# Patient Record
Sex: FEMALE | Race: ASIAN | NOT HISPANIC OR LATINO | URBAN - METROPOLITAN AREA
[De-identification: names, ages, dates, MRNs, and addresses within clinical notes are randomized per-mention and may not be internally consistent; named-entity substitution may affect disease eponyms.]

---

## 2021-06-23 ENCOUNTER — EMERGENCY (EMERGENCY)
Facility: HOSPITAL | Age: 21
LOS: 1 days | Discharge: HOME | End: 2021-06-23
Admitting: EMERGENCY MEDICINE
Payer: MEDICAID

## 2021-06-23 VITALS
RESPIRATION RATE: 16 BRPM | DIASTOLIC BLOOD PRESSURE: 77 MMHG | HEART RATE: 70 BPM | SYSTOLIC BLOOD PRESSURE: 119 MMHG | TEMPERATURE: 98 F | OXYGEN SATURATION: 100 %

## 2021-06-23 PROCEDURE — 99283 EMERGENCY DEPT VISIT LOW MDM: CPT
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
## 2021-06-24 PROCEDURE — 73564 X-RAY EXAM KNEE 4 OR MORE: CPT | Mod: 26,LT
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
## 2021-06-24 NOTE — ED PROVIDER NOTE - PATIENT PORTAL LINK FT
You can access the FollowMyHealth Patient Portal offered by [facility] by registering at the following website: [email or web address]. By joining [employer]s FollowMyHealth portal, you will also be able to view your health information using other applications (apps) compatible with our system.

## 2021-07-01 ENCOUNTER — EMERGENCY (EMERGENCY)
Facility: HOSPITAL | Age: 21
LOS: 1 days | Discharge: HOME | End: 2021-07-01
Admitting: STUDENT IN AN ORGANIZED HEALTH CARE EDUCATION/TRAINING PROGRAM
Payer: MEDICAID

## 2021-07-01 VITALS
HEART RATE: 98 BPM | SYSTOLIC BLOOD PRESSURE: 114 MMHG | TEMPERATURE: 99 F | OXYGEN SATURATION: 100 % | RESPIRATION RATE: 16 BRPM | DIASTOLIC BLOOD PRESSURE: 62 MMHG

## 2021-07-01 PROCEDURE — 99284 EMERGENCY DEPT VISIT MOD MDM: CPT

## 2021-07-01 RX ORDER — EPINEPHRINE 0.3 MG/.3ML
1 INJECTION INTRAMUSCULAR; SUBCUTANEOUS
Qty: 1 | Refills: 0
Start: 2021-07-01 | End: 2021-07-01

## 2021-07-01 RX ORDER — DIPHENHYDRAMINE HCL 50 MG
25 CAPSULE ORAL ONCE
Refills: 0 | Status: COMPLETED | OUTPATIENT
Start: 2021-07-01 | End: 2021-07-01

## 2021-07-01 RX ORDER — DIPHENHYDRAMINE HCL 50 MG
1 CAPSULE ORAL
Qty: 10 | Refills: 0
Start: 2021-07-01 | End: 2021-07-10

## 2021-07-01 RX ADMIN — Medication 25 MILLIGRAM(S): at 13:46

## 2021-07-01 NOTE — ED PROVIDER NOTE - OBJECTIVE STATEMENT
20F with no significant pmh BIB mother today p/w R neck lump x 2 weeks, diffuse rash x several days and upper lip swelling yesterday. Pt noticed 2 lumps on the R side of her neck. Lump non tender and without and redness. States she noticed the lumps decreasing in size. Rash is pruritic, diffuse over torso, extremities, sparring the palms, face and soles of feet. States the rash last for a day and disappears the next day. Currently rash completely resolved. Noticed upper lip swelling yesterday which is now greatly improved. No suspicious food intake, taking new medication, drug use, respiratory distress, tongue swelling, globus sensation, trismus, excessive drooling.

## 2021-07-01 NOTE — ED ADULT TRIAGE NOTE - CHIEF COMPLAINT QUOTE
Pt c/o of lump to right side of neck, lip swelling and intermittent rash like throughout body. Denies sob, n/v/d, chills/fevers. Pt able to speak in full complete sentences, appears comfortable at triage.

## 2021-07-01 NOTE — ED PROVIDER NOTE - PATIENT PORTAL LINK FT
You can access the FollowMyHealth Patient Portal offered by [facility] by registering at the following website: [email or web address]. By joining [employer]’s FollowMyHealth portal, you will also be able to view your health information using other applications (apps) compatible with our system.

## 2021-07-01 NOTE — ED PROVIDER NOTE - PHYSICAL EXAMINATION
constitutional: well appearing no acute distress, sitting comfortably   HEENT: head- normocephalic, atraumatic                 ears- ears clear b/l, TM visualized b/l                 eyes- PERRLA                neck- full rom, no rigidity, small 2 - 1cm mobile R posterior cerivcal LAD                 throat- clear oropharynx no tonsillar exudates or tonsillar swelling, uvula midline without edema  Cardiac: regular rate and rhythm, normal S1 S2 no murmurs rubs or gallops  Respiratory: able to speak in full sentences, no wheezing rales or rhonchi, lungs CTA b/l   Abd: Soft non tender non distended, no guarding, no palpable hernias or masses   Msk: no LE edema, no tenderness b/l   Neuro: A x O x 4, face symmetric, cn 2-12 in tact, strength 5/5 in b/l ue and le, sensation grossly in tact to light touch, no dysmetria or nystagmus, neg pronator drift neg romberg's, no ataxia   psych: normal behavior and thought process  Skin: no notable rash over extremities, torso/ face, no signs of angioedema

## 2021-07-01 NOTE — ED ADULT NURSE NOTE - OBJECTIVE STATEMENT
Pt AOX4 c/o of 2 lumps to back of neck, 2 distinct small nodules felt, pt denies tenderness, denies any recent ENT infection or trauma to area; medication given as per MD.

## 2021-07-01 NOTE — ED PROVIDER NOTE - CLINICAL SUMMARY MEDICAL DECISION MAKING FREE TEXT BOX
20F with no significant pmh BIB mother today p/w R neck lump x 2 weeks, diffuse rash x several days and upper lip swelling yesterday. No signs of angioedema on exam. no rashes appreciated. ddx: heat rash? vs allergic reaction.     Plan:  - will give 1 dose benadryl in ER and observe  - if no signs of worsening symptoms will d/c and give ENT f/u for LAD and strict return precautions for any worsening symptoms including globus/ throat closing sensation, worsening rash.

## 2021-07-01 NOTE — ED PROVIDER NOTE - NSFOLLOWUPINSTRUCTIONS_ED_ALL_ED_FT
You were seen and evaluated at [facility] ER. take benadryl as needed for any itching and rash. Return to ER immediately should you have to use the epi pen or have any worsening symptoms. you were also given ENT referral to follow up the lymph node.

## 2021-07-01 NOTE — ED PROVIDER NOTE - PROGRESS NOTE DETAILS
Maciel Reed: pt observed in the ER for any worsening symptoms of rash, angioedema. Pt is well appearing without any symptoms at this time. ENT referral list given to f/u on LAD. Pt given  return precautions should she develop worsening symptoms especially w/ airway involvement. Pt verbalizes understanding and agrees w/ plan.

## 2021-11-12 ENCOUNTER — EMERGENCY (EMERGENCY)
Facility: HOSPITAL | Age: 21
LOS: 1 days | Discharge: HOME | End: 2021-11-12
Attending: STUDENT IN AN ORGANIZED HEALTH CARE EDUCATION/TRAINING PROGRAM | Admitting: STUDENT IN AN ORGANIZED HEALTH CARE EDUCATION/TRAINING PROGRAM
Payer: MEDICAID

## 2021-11-12 VITALS
TEMPERATURE: 99 F | HEART RATE: 77 BPM | OXYGEN SATURATION: 100 % | SYSTOLIC BLOOD PRESSURE: 115 MMHG | DIASTOLIC BLOOD PRESSURE: 67 MMHG | RESPIRATION RATE: 18 BRPM

## 2021-11-12 PROCEDURE — 64450 NJX AA&/STRD OTHER PN/BRANCH: CPT

## 2021-11-12 PROCEDURE — 73140 X-RAY EXAM OF FINGER(S): CPT | Mod: 26,LT

## 2021-11-12 PROCEDURE — 99283 EMERGENCY DEPT VISIT LOW MDM: CPT | Mod: 25

## 2021-11-12 RX ORDER — TETANUS TOXOID, REDUCED DIPHTHERIA TOXOID AND ACELLULAR PERTUSSIS VACCINE, ADSORBED 5; 2.5; 8; 8; 2.5 [IU]/.5ML; [IU]/.5ML; UG/.5ML; UG/.5ML; UG/.5ML
0.5 SUSPENSION INTRAMUSCULAR ONCE
Refills: 0 | Status: COMPLETED | OUTPATIENT
Start: 2021-11-12 | End: 2021-11-12

## 2021-11-12 RX ADMIN — TETANUS TOXOID, REDUCED DIPHTHERIA TOXOID AND ACELLULAR PERTUSSIS VACCINE, ADSORBED 0.5 MILLILITER(S): 5; 2.5; 8; 8; 2.5 SUSPENSION INTRAMUSCULAR at 23:28

## 2021-11-12 NOTE — ED ADULT TRIAGE NOTE - CHIEF COMPLAINT QUOTE
Pt st" I have acrylic nails and I ripped the nail ...it is cracked and sort of 1/2 ripped off...happen 3 hours ago"

## 2021-11-12 NOTE — ED PROVIDER NOTE - CARE PROVIDER_API CALL
Marco Bush)  Plastic Surgery  [address] [place] [address], [address] [place] [id number]  Phone: [phone]  Fax: [phone]  Follow Up Time:

## 2021-11-12 NOTE — ED PROVIDER NOTE - NS ED ROS FT
GENERAL: No fever, no chills  EYES: No change in vision  HEENT: No trouble swallowing or speaking  CARDIAC: No chest pain  PULMONARY: No cough, no SOB  GI: No abdominal pain, no nausea or no vomiting, no diarrhea, no constipation  : No changes in urination  SKIN: No rashes  NEURO: No headache, no numbness  MSK: No joint pain  Otherwise as HPI or negative. GENERAL: No fever, no chills  EYES: No change in vision  HEENT: No trouble swallowing or speaking  CARDIAC: No chest pain  PULMONARY: No cough, no SOB  GI: No abdominal pain, no nausea or no vomiting, no diarrhea, no constipation  : No changes in urination  SKIN: No rashes  NEURO: No headache, no numbness  MSK: +finger pain   Otherwise as HPI or negative.

## 2021-11-12 NOTE — ED PROVIDER NOTE - PHYSICAL EXAMINATION
Gen: NAD, AOx3, able to make needs known, non-toxic  Head: NCAT  HEENT: pupils equal and reactive to light, EOMI, oral mucosa moist, normal conjunctiva  Lung: CTAB, no respiratory distress, no wheezes/rhonchi/rales B/L, speaking in full sentences  CV: RRR, no M/R/G, pulses bilaterally   Abd: soft, NTND, no guarding, no CVA tenderness, BSX4  MSK: no visible deformities  Neuro: No focal sensory or motor deficits  Skin: Warm, well perfused, no rash  Psych: normal affect Gen: NAD, AOx3, able to make needs known, non-toxic  Head: NCAT  HEENT: EOMI, normal conjunctiva  MSK: left 3rd finger nail still on finger, horizontal breakage visualized, no current bleeding, unable to visualize nail bed, no hematoma around nail bed   Neuro: No focal sensory or motor deficits  Skin: Warm, well perfused, no rash  Psych: normal affect

## 2021-11-12 NOTE — ED PROVIDER NOTE - NSFOLLOWUPINSTRUCTIONS_ED_ALL_ED_FT
Follow up with the hand surgeon this week for continued evaluation of your nail injury. Keep your dressing intact for 24 hours and then you may change daily. Keep the area clean and dry. Return for any new/worsening symptoms or any other concern such as worsening pain/swelling under nail, pus drainage, fever, spreading pain or any other concern

## 2021-11-12 NOTE — ED PROVIDER NOTE - PATIENT PORTAL LINK FT
You can access the FollowMyHealth Patient Portal offered by [facility] by registering at the following website: [email or web address]. By joining [employer]’s FollowMyHealth portal, you will also be able to view your health information using other applications (apps) compatible with our system.

## 2021-11-12 NOTE — ED PROVIDER NOTE - OBJECTIVE STATEMENT
22yo woman who wears acrylics nails coming in after being in the gym and getting her left 3rd finger nail caught in between her shoe and the barbell, causing a horizontal breakage of the nail. Profuse bleeding on scene, not currently bleeding right now. Nail still on finger. Denies PMH, meds, allergies.

## 2021-11-12 NOTE — ED PROVIDER NOTE - ATTENDING CONTRIBUTION TO CARE
I have personally performed a face to face medical and diagnostic evaluation of the patient. I have discussed with and reviewed the Resident's note and agree with the History, ROS, Physical Exam and MDM unless otherwise indicated. A brief summary of my personal evaluation and impression can be found below.    22yo F s/p injury to L 3rd fingernail getting caught between shoe and barbell causing breakage of nail, was not dropped onto nail, bleeding transiently now resolved. No significant pain to area currently, nail still on. Acrylic nail in place.  VITALS: Initial triage and subsequent vitals have been reviewed by me.  Gen: Well appearing, NAD, alert, non-toxic  Head: NCAT  Lung: CTAB, no adventitious sounds  CV: RRR, no murmurs, 2+symmetric peripheral pulses  MSK: No edema, no visible deformities, no bony ttp  Neuro: Moving all extremity grossly, following commands appropriately, fluid speech, sensation grossly intact  Skin: acrylic nail in place w/ horizontal breakage across distal 3rd  Psych: normal mood and affect  AFTER PARTIAL REMOVAL of nail: No nailbed lac or subungual hematoma no active bleeding  S/p nailbed partial avulsion clean w/ no subungual hematoma or nailbed lac. Will give tdap, xr r/o fx and dc w/ hand surg fu for cintinued eval

## 2022-07-10 ENCOUNTER — EMERGENCY (EMERGENCY)
Facility: HOSPITAL | Age: 22
LOS: 1 days | Discharge: HOME | End: 2022-07-10
Attending: EMERGENCY MEDICINE | Admitting: EMERGENCY MEDICINE

## 2022-07-10 VITALS
RESPIRATION RATE: 20 BRPM | OXYGEN SATURATION: 99 % | TEMPERATURE: 98 F | HEART RATE: 78 BPM | DIASTOLIC BLOOD PRESSURE: 67 MMHG | SYSTOLIC BLOOD PRESSURE: 114 MMHG

## 2022-07-10 VITALS
HEART RATE: 75 BPM | TEMPERATURE: 98 F | RESPIRATION RATE: 16 BRPM | DIASTOLIC BLOOD PRESSURE: 60 MMHG | OXYGEN SATURATION: 100 % | SYSTOLIC BLOOD PRESSURE: 118 MMHG

## 2022-07-10 LAB
ALBUMIN SERPL ELPH-MCNC: 4.5 G/DL — SIGNIFICANT CHANGE UP (ref 3.3–5)
ALP SERPL-CCNC: 62 U/L — SIGNIFICANT CHANGE UP (ref 40–120)
ALT FLD-CCNC: 10 U/L — SIGNIFICANT CHANGE UP (ref 4–33)
ANION GAP SERPL CALC-SCNC: 13 MMOL/L — SIGNIFICANT CHANGE UP (ref 7–14)
APPEARANCE UR: ABNORMAL
AST SERPL-CCNC: 18 U/L — SIGNIFICANT CHANGE UP (ref 4–32)
BACTERIA # UR AUTO: ABNORMAL
BASOPHILS # BLD AUTO: 0.03 K/UL — SIGNIFICANT CHANGE UP (ref 0–0.2)
BASOPHILS NFR BLD AUTO: 0.3 % — SIGNIFICANT CHANGE UP (ref 0–2)
BILIRUB SERPL-MCNC: 0.9 MG/DL — SIGNIFICANT CHANGE UP (ref 0.2–1.2)
BILIRUB UR-MCNC: NEGATIVE — SIGNIFICANT CHANGE UP
BUN SERPL-MCNC: 8 MG/DL — SIGNIFICANT CHANGE UP (ref 7–23)
CALCIUM SERPL-MCNC: 9.6 MG/DL — SIGNIFICANT CHANGE UP (ref 8.4–10.5)
CHLORIDE SERPL-SCNC: 100 MMOL/L — SIGNIFICANT CHANGE UP (ref 98–107)
CO2 SERPL-SCNC: 24 MMOL/L — SIGNIFICANT CHANGE UP (ref 22–31)
COLOR SPEC: YELLOW — SIGNIFICANT CHANGE UP
COMMENT - URINE: SIGNIFICANT CHANGE UP
CREAT SERPL-MCNC: 0.7 MG/DL — SIGNIFICANT CHANGE UP (ref 0.5–1.3)
DIFF PNL FLD: ABNORMAL
EGFR: 127 ML/MIN/1.73M2 — SIGNIFICANT CHANGE UP
EOSINOPHIL # BLD AUTO: 0.04 K/UL — SIGNIFICANT CHANGE UP (ref 0–0.5)
EOSINOPHIL NFR BLD AUTO: 0.4 % — SIGNIFICANT CHANGE UP (ref 0–6)
EPI CELLS # UR: 3 /HPF — SIGNIFICANT CHANGE UP (ref 0–5)
GLUCOSE SERPL-MCNC: 90 MG/DL — SIGNIFICANT CHANGE UP (ref 70–99)
GLUCOSE UR QL: NEGATIVE — SIGNIFICANT CHANGE UP
HCT VFR BLD CALC: 40.9 % — SIGNIFICANT CHANGE UP (ref 34.5–45)
HGB BLD-MCNC: 13.4 G/DL — SIGNIFICANT CHANGE UP (ref 11.5–15.5)
HYALINE CASTS # UR AUTO: 3 /LPF — SIGNIFICANT CHANGE UP (ref 0–7)
IANC: 6.67 K/UL — SIGNIFICANT CHANGE UP (ref 1.8–7.4)
IMM GRANULOCYTES NFR BLD AUTO: 0.4 % — SIGNIFICANT CHANGE UP (ref 0–1.5)
KETONES UR-MCNC: ABNORMAL
LEUKOCYTE ESTERASE UR-ACNC: ABNORMAL
LYMPHOCYTES # BLD AUTO: 1.49 K/UL — SIGNIFICANT CHANGE UP (ref 1–3.3)
LYMPHOCYTES # BLD AUTO: 16.6 % — SIGNIFICANT CHANGE UP (ref 13–44)
MCHC RBC-ENTMCNC: 27.5 PG — SIGNIFICANT CHANGE UP (ref 27–34)
MCHC RBC-ENTMCNC: 32.8 GM/DL — SIGNIFICANT CHANGE UP (ref 32–36)
MCV RBC AUTO: 84 FL — SIGNIFICANT CHANGE UP (ref 80–100)
MONOCYTES # BLD AUTO: 0.73 K/UL — SIGNIFICANT CHANGE UP (ref 0–0.9)
MONOCYTES NFR BLD AUTO: 8.1 % — SIGNIFICANT CHANGE UP (ref 2–14)
NEUTROPHILS # BLD AUTO: 6.67 K/UL — SIGNIFICANT CHANGE UP (ref 1.8–7.4)
NEUTROPHILS NFR BLD AUTO: 74.2 % — SIGNIFICANT CHANGE UP (ref 43–77)
NITRITE UR-MCNC: NEGATIVE — SIGNIFICANT CHANGE UP
NRBC # BLD: 0 /100 WBCS — SIGNIFICANT CHANGE UP
NRBC # FLD: 0 K/UL — SIGNIFICANT CHANGE UP
PH UR: 6.5 — SIGNIFICANT CHANGE UP (ref 5–8)
PLATELET # BLD AUTO: 196 K/UL — SIGNIFICANT CHANGE UP (ref 150–400)
POTASSIUM SERPL-MCNC: 4.3 MMOL/L — SIGNIFICANT CHANGE UP (ref 3.5–5.3)
POTASSIUM SERPL-SCNC: 4.3 MMOL/L — SIGNIFICANT CHANGE UP (ref 3.5–5.3)
PROT SERPL-MCNC: 7.5 G/DL — SIGNIFICANT CHANGE UP (ref 6–8.3)
PROT UR-MCNC: ABNORMAL
RBC # BLD: 4.87 M/UL — SIGNIFICANT CHANGE UP (ref 3.8–5.2)
RBC # FLD: 13.3 % — SIGNIFICANT CHANGE UP (ref 10.3–14.5)
RBC CASTS # UR COMP ASSIST: >50 /HPF — SIGNIFICANT CHANGE UP (ref 0–4)
SODIUM SERPL-SCNC: 137 MMOL/L — SIGNIFICANT CHANGE UP (ref 135–145)
SP GR SPEC: 1.02 — SIGNIFICANT CHANGE UP (ref 1–1.05)
UROBILINOGEN FLD QL: SIGNIFICANT CHANGE UP
WBC # BLD: 9 K/UL — SIGNIFICANT CHANGE UP (ref 3.8–10.5)
WBC # FLD AUTO: 9 K/UL — SIGNIFICANT CHANGE UP (ref 3.8–10.5)
WBC UR QL: >50 /HPF — SIGNIFICANT CHANGE UP (ref 0–5)

## 2022-07-10 PROCEDURE — 99285 EMERGENCY DEPT VISIT HI MDM: CPT

## 2022-07-10 PROCEDURE — 74177 CT ABD & PELVIS W/CONTRAST: CPT | Mod: 26,MA

## 2022-07-10 PROCEDURE — 76770 US EXAM ABDO BACK WALL COMP: CPT | Mod: 26

## 2022-07-10 RX ORDER — CEPHALEXIN 500 MG
1 CAPSULE ORAL
Qty: 20 | Refills: 0
Start: 2022-07-10 | End: 2022-07-19

## 2022-07-10 RX ORDER — IBUPROFEN 200 MG
600 TABLET ORAL ONCE
Refills: 0 | Status: COMPLETED | OUTPATIENT
Start: 2022-07-10 | End: 2022-07-10

## 2022-07-10 RX ORDER — CEFPODOXIME PROXETIL 100 MG
200 TABLET ORAL ONCE
Refills: 0 | Status: COMPLETED | OUTPATIENT
Start: 2022-07-10 | End: 2022-07-10

## 2022-07-10 RX ORDER — CEFPODOXIME PROXETIL 100 MG
1 TABLET ORAL
Qty: 20 | Refills: 0
Start: 2022-07-10 | End: 2022-07-19

## 2022-07-10 RX ORDER — ACETAMINOPHEN 500 MG
650 TABLET ORAL ONCE
Refills: 0 | Status: COMPLETED | OUTPATIENT
Start: 2022-07-10 | End: 2022-07-10

## 2022-07-10 RX ORDER — LIDOCAINE 4 G/100G
1 CREAM TOPICAL ONCE
Refills: 0 | Status: COMPLETED | OUTPATIENT
Start: 2022-07-10 | End: 2022-07-10

## 2022-07-10 RX ADMIN — Medication 650 MILLIGRAM(S): at 17:26

## 2022-07-10 RX ADMIN — Medication 200 MILLIGRAM(S): at 18:54

## 2022-07-10 RX ADMIN — LIDOCAINE 1 PATCH: 4 CREAM TOPICAL at 17:26

## 2022-07-10 RX ADMIN — Medication 600 MILLIGRAM(S): at 17:40

## 2022-07-10 NOTE — ED PROVIDER NOTE - ATTENDING CONTRIBUTION TO CARE
DR. BLOCH, ATTENDING MD-  I performed a face to face bedside interview with patient regarding history of present illness, review of symptoms and past medical history. I completed an independent physical exam.  I have discussed patient's plan of care with the resident.  Patient examined, well appearing NAD HEENT nml heart s1s2, lungs clear, abd soft nontender, extrem no edema, pos right CVA tenderness, extrem neg slr, neuro intact

## 2022-07-10 NOTE — ED PROVIDER NOTE - MDM ORDERS SUBMITTED SELECTION

## 2022-07-10 NOTE — ED ADULT NURSE REASSESSMENT NOTE - NS ED NURSE REASSESS COMMENT FT1
PO challenge passed. Pt in NAD, denies nausea, vomiting or abd pain
PT A&OX4.  No distress noted.  Denies any discomfort.  IVL Left 20G in place and tolerating well.  Labs sent.  PT ABX given.  Will continue to monitor.
patient alert ox4 came in c/o flank pain. denies any pain now. awaiting results.

## 2022-07-10 NOTE — ED PROVIDER NOTE - PATIENT PORTAL LINK FT
You can access the FollowMyHealth Patient Portal offered by [facility] by registering at the following website: [email or web address]. By joining [employer]’s FollowMyHealth portal, you will also be able to view your health information using other applications (apps) compatible with our system. You can access the FollowMyHealth Patient Portal offered by [facility] by registering at the following website: [email or web address]. By joining [employer]’s FollowMyHealth portal, you will also be able to view your health information using other applications (apps) compatible with our system.

## 2022-07-10 NOTE — ED PROVIDER NOTE - PROGRESS NOTE DETAILS
Sekou Palomo- UA positive. will give cefpodoxime. US renal shows perinephric findings. spoke to pt about ct, pt agrees Sekou Palomo- UA positive. will give cefpodoxime. US renal shows perinephric findings. discussed results with radiology, they recommend ct. spoke to pt about ct, pt agrees. pt feels better. lauren roldan: u/s shows cholelithiasis, pt states that shes feeling much better, + tolerating PO, will d/c with surgery f/u lauren roldan: pt reassesed, states that shes feeling much better, results of labs, ua, ct/u/s discussed with her, she will f/u with GYN outpatient, will give  abx for her uti

## 2022-07-10 NOTE — ED PROVIDER NOTE - PHYSICAL EXAMINATION
General appearance: NAD, conversant, afebrile    Eyes: anicteric sclerae, CECILIO, EOMI   HENT: Atraumatic; oropharynx clear, MMM and no ulcerations, no pharyngeal erythema or exudate   Neck: Trachea midline; Full range of motion, supple   Pulm: CTA bl, normal respiratory effort and no intercostal retractions, normal work of breathing   CV: RRR, No murmurs, rubs, or gallops.    Abdomen: +R cvat, abd Soft, non-tender, non-distended; no guarding or rebound   Extremities: No peripheral edema or extremity lymphadenopathy. 5/5 strength in all four extremities.   Skin: Dry, normal temperature, turgor and texture; no rash, ulcers or subcutaneous nodules   Psych: Appropriate affect, cooperative; alert and oriented to person, place and time

## 2022-07-10 NOTE — ED PROVIDER NOTE - CLINICAL SUMMARY MEDICAL DECISION MAKING FREE TEXT BOX
20yo F w/ no pmh presenting with R flank pain with urinary urgency. No hx kidney stones, pain is worse with movement, not as present at rest. PE shows R CVAT. will get ua, US kidneys to eval for uti vs pyelo vs kidney stone vs msk

## 2022-07-10 NOTE — ED PROVIDER NOTE - OBJECTIVE STATEMENT
22yo F w/ no pmh presenting with R flank pain. Pt started having sudden onset R flank pain yesterday 5pm. Feels sharp, no radiation, throbbing. Has not taken anything for pain. Worse with movement. She weightlifts but has not done it for 5 days. She has urinary urgency, no frequency, dysuria or hematuria. No hx kidney stones. LMP started 3d ago, she is on her 4th day, now towards the end. No fever, cp, sob, n/v/d.

## 2023-01-14 ENCOUNTER — EMERGENCY (EMERGENCY)
Facility: HOSPITAL | Age: 23
LOS: 1 days | Discharge: HOME | End: 2023-01-14
Attending: EMERGENCY MEDICINE | Admitting: EMERGENCY MEDICINE
Payer: MEDICAID

## 2023-01-14 VITALS
TEMPERATURE: 99 F | HEART RATE: 110 BPM | RESPIRATION RATE: 18 BRPM | OXYGEN SATURATION: 100 % | DIASTOLIC BLOOD PRESSURE: 79 MMHG | SYSTOLIC BLOOD PRESSURE: 119 MMHG

## 2023-01-14 PROCEDURE — 99284 EMERGENCY DEPT VISIT MOD MDM: CPT

## 2023-01-14 RX ORDER — PSEUDOEPHEDRINE HCL 30 MG
60 TABLET ORAL ONCE
Refills: 0 | Status: COMPLETED | OUTPATIENT
Start: 2023-01-14 | End: 2023-01-14

## 2023-01-14 RX ORDER — ACETAMINOPHEN 500 MG
650 TABLET ORAL ONCE
Refills: 0 | Status: COMPLETED | OUTPATIENT
Start: 2023-01-14 | End: 2023-01-14

## 2023-01-14 RX ORDER — IBUPROFEN 200 MG
600 TABLET ORAL ONCE
Refills: 0 | Status: COMPLETED | OUTPATIENT
Start: 2023-01-14 | End: 2023-01-14

## 2023-01-14 RX ADMIN — Medication 650 MILLIGRAM(S): at 23:41

## 2023-01-14 RX ADMIN — Medication 600 MILLIGRAM(S): at 23:41

## 2023-01-14 RX ADMIN — Medication 60 MILLIGRAM(S): at 23:41

## 2023-01-14 NOTE — ED PROVIDER NOTE - PATIENT PORTAL LINK FT
You can access the FollowMyHealth Patient Portal offered by [facility] by registering at the following website: [email or web address]. By joining [employer]s FollowMyHealth portal, you will also be able to view your health information using other applications (apps) compatible with our system.

## 2023-01-14 NOTE — ED ADULT NURSE NOTE - OBJECTIVE STATEMENT
23 y/o female presents to the ED c/o HA, cough and congestion. Pt speaking in full, complete sentences with respirations even and unlabored on RA. Denies CP, dizziness, n/v/d, or fevers. 23 y/o female presents to the ED c/o HA, cough and congestion. Also endorsing waking up and not being able to breathe from her nose. Pt speaking in full, complete sentences with respirations even and unlabored on RA. Denies CP, dizziness, n/v/d, or fevers.

## 2023-01-14 NOTE — ED PROVIDER NOTE - PROGRESS NOTE DETAILS
Boris CHOI: Pt reassessed and feels better. I independently reviewed the labs and/or imaging results, and there are no emergent findings.  CXR is clear, no infiltrate.  Pt stable for dc home.

## 2023-01-14 NOTE — ED PROVIDER NOTE - OBJECTIVE STATEMENT
22-year-old otherwise healthy female here with 2 days of cough, sore throat, congestion.  Patient reports shortness of breath, which she describes as waking up being unable to breathe out of her nose.  No shortness of breath with exertion or during the day when she is awake.  She denies associated fevers, chills, back pain, chest pain, abdominal pain, N/V/D, leg pain or swelling.  LMP last week.  She is vaccinated against COVID x2, but not flu.  Denies any known sick contacts.

## 2023-01-14 NOTE — ED PROVIDER NOTE - NSFOLLOWUPINSTRUCTIONS_ED_ALL_ED_FT
Drink plenty of fluids.  You can take ibuprofen 600mg every 6 hours or Tylenol 650mg every 4 hours as needed for pain or fever.  You can also take over-the-counter cough and cold medication for your symptoms as needed.  You received a viral test for RSV, flu, and covid - you should receive results via text message or email within 24 hours.  Follow-up with your PMD in 1 week as needed.  Return to the emergency department for any new or worsening symptoms.

## 2023-01-14 NOTE — ED PROVIDER NOTE - CLINICAL SUMMARY MEDICAL DECISION MAKING FREE TEXT BOX
23 y/o F with 2 days of uri sxs.  likely viral in origin - will swab for flu/covid/rsv.  pt with low grade fever here.  plan for supportive care with antipyretics and decongestants, cxr r/o underlying pna, ekg for screening although very low suspicion for cardiac etiology.

## 2023-01-15 VITALS
OXYGEN SATURATION: 97 % | SYSTOLIC BLOOD PRESSURE: 114 MMHG | TEMPERATURE: 98 F | HEART RATE: 97 BPM | DIASTOLIC BLOOD PRESSURE: 57 MMHG | RESPIRATION RATE: 18 BRPM

## 2023-01-15 LAB
FLUAV AG NPH QL: DETECTED
FLUBV AG NPH QL: SIGNIFICANT CHANGE UP
RSV RNA NPH QL NAA+NON-PROBE: SIGNIFICANT CHANGE UP
SARS-COV-2 RNA SPEC QL NAA+PROBE: SIGNIFICANT CHANGE UP

## 2023-01-15 PROCEDURE — 71046 X-RAY EXAM CHEST 2 VIEWS: CPT | Mod: 26

## 2023-01-15 NOTE — ED POST DISCHARGE NOTE - REASON FOR FOLLOW-UP
Other Influenza A detected. Patient contacted and advised on supportive care measures, and on social distancing/masking to minimize spread. Patient verbalized understanding. Emailed results to [email or web address]

## 2023-09-28 NOTE — ED PROVIDER NOTE - ATTENDING WITH...
Patient called requesting a letter for work excusing her from work 9/23/2023 through 10/2/2023. States she was in the hospital. Hospital stay was 9/23-9/27. Is this okay to write? Resident

## 2024-10-28 ENCOUNTER — EMERGENCY (EMERGENCY)
Facility: HOSPITAL | Age: 24
LOS: 1 days | Discharge: HOME | End: 2024-10-28
Attending: EMERGENCY MEDICINE | Admitting: EMERGENCY MEDICINE

## 2024-10-28 VITALS
OXYGEN SATURATION: 100 % | DIASTOLIC BLOOD PRESSURE: 65 MMHG | SYSTOLIC BLOOD PRESSURE: 103 MMHG | HEART RATE: 72 BPM | WEIGHT: 130.07 LBS | RESPIRATION RATE: 16 BRPM | TEMPERATURE: 98 F

## 2024-10-28 LAB
ALBUMIN SERPL ELPH-MCNC: 4.5 G/DL — SIGNIFICANT CHANGE UP (ref 3.3–5)
ALP SERPL-CCNC: 40 U/L — SIGNIFICANT CHANGE UP (ref 40–120)
ALT FLD-CCNC: 9 U/L — SIGNIFICANT CHANGE UP (ref 4–33)
ANION GAP SERPL CALC-SCNC: 10 MMOL/L — SIGNIFICANT CHANGE UP (ref 7–14)
APPEARANCE UR: ABNORMAL
AST SERPL-CCNC: 15 U/L — SIGNIFICANT CHANGE UP (ref 4–32)
BACTERIA # UR AUTO: ABNORMAL /HPF
BASOPHILS # BLD AUTO: 0.02 K/UL — SIGNIFICANT CHANGE UP (ref 0–0.2)
BASOPHILS NFR BLD AUTO: 0.4 % — SIGNIFICANT CHANGE UP (ref 0–2)
BILIRUB SERPL-MCNC: 0.6 MG/DL — SIGNIFICANT CHANGE UP (ref 0.2–1.2)
BILIRUB UR-MCNC: NEGATIVE — SIGNIFICANT CHANGE UP
BUN SERPL-MCNC: 11 MG/DL — SIGNIFICANT CHANGE UP (ref 7–23)
CALCIUM SERPL-MCNC: 9.5 MG/DL — SIGNIFICANT CHANGE UP (ref 8.4–10.5)
CAST: 0 /LPF — SIGNIFICANT CHANGE UP (ref 0–4)
CHLORIDE SERPL-SCNC: 104 MMOL/L — SIGNIFICANT CHANGE UP (ref 98–107)
CO2 SERPL-SCNC: 25 MMOL/L — SIGNIFICANT CHANGE UP (ref 22–31)
COLOR SPEC: YELLOW — SIGNIFICANT CHANGE UP
CREAT SERPL-MCNC: 0.74 MG/DL — SIGNIFICANT CHANGE UP (ref 0.5–1.3)
DIFF PNL FLD: NEGATIVE — SIGNIFICANT CHANGE UP
EGFR: 117 ML/MIN/1.73M2 — SIGNIFICANT CHANGE UP
EOSINOPHIL # BLD AUTO: 0.03 K/UL — SIGNIFICANT CHANGE UP (ref 0–0.5)
EOSINOPHIL NFR BLD AUTO: 0.6 % — SIGNIFICANT CHANGE UP (ref 0–6)
GLUCOSE SERPL-MCNC: 92 MG/DL — SIGNIFICANT CHANGE UP (ref 70–99)
GLUCOSE UR QL: NEGATIVE MG/DL — SIGNIFICANT CHANGE UP
HCG SERPL-ACNC: <1 MIU/ML — SIGNIFICANT CHANGE UP
HCT VFR BLD CALC: 35.6 % — SIGNIFICANT CHANGE UP (ref 34.5–45)
HGB BLD-MCNC: 12.3 G/DL — SIGNIFICANT CHANGE UP (ref 11.5–15.5)
IANC: 4.02 K/UL — SIGNIFICANT CHANGE UP (ref 1.8–7.4)
IMM GRANULOCYTES NFR BLD AUTO: 0.4 % — SIGNIFICANT CHANGE UP (ref 0–0.9)
KETONES UR-MCNC: ABNORMAL MG/DL
LEUKOCYTE ESTERASE UR-ACNC: ABNORMAL
LIDOCAIN IGE QN: 14 U/L — SIGNIFICANT CHANGE UP (ref 7–60)
LYMPHOCYTES # BLD AUTO: 0.99 K/UL — LOW (ref 1–3.3)
LYMPHOCYTES # BLD AUTO: 18.2 % — SIGNIFICANT CHANGE UP (ref 13–44)
MCHC RBC-ENTMCNC: 28.1 PG — SIGNIFICANT CHANGE UP (ref 27–34)
MCHC RBC-ENTMCNC: 34.6 GM/DL — SIGNIFICANT CHANGE UP (ref 32–36)
MCV RBC AUTO: 81.5 FL — SIGNIFICANT CHANGE UP (ref 80–100)
MONOCYTES # BLD AUTO: 0.37 K/UL — SIGNIFICANT CHANGE UP (ref 0–0.9)
MONOCYTES NFR BLD AUTO: 6.8 % — SIGNIFICANT CHANGE UP (ref 2–14)
NEUTROPHILS # BLD AUTO: 4.02 K/UL — SIGNIFICANT CHANGE UP (ref 1.8–7.4)
NEUTROPHILS NFR BLD AUTO: 73.6 % — SIGNIFICANT CHANGE UP (ref 43–77)
NITRITE UR-MCNC: NEGATIVE — SIGNIFICANT CHANGE UP
NRBC # BLD: 0 /100 WBCS — SIGNIFICANT CHANGE UP (ref 0–0)
NRBC # FLD: 0 K/UL — SIGNIFICANT CHANGE UP (ref 0–0)
PH UR: 6 — SIGNIFICANT CHANGE UP (ref 5–8)
PLATELET # BLD AUTO: 188 K/UL — SIGNIFICANT CHANGE UP (ref 150–400)
POTASSIUM SERPL-MCNC: 4.3 MMOL/L — SIGNIFICANT CHANGE UP (ref 3.5–5.3)
POTASSIUM SERPL-SCNC: 4.3 MMOL/L — SIGNIFICANT CHANGE UP (ref 3.5–5.3)
PROT SERPL-MCNC: 6.9 G/DL — SIGNIFICANT CHANGE UP (ref 6–8.3)
PROT UR-MCNC: SIGNIFICANT CHANGE UP MG/DL
RBC # BLD: 4.37 M/UL — SIGNIFICANT CHANGE UP (ref 3.8–5.2)
RBC # FLD: 12.5 % — SIGNIFICANT CHANGE UP (ref 10.3–14.5)
RBC CASTS # UR COMP ASSIST: 0 /HPF — SIGNIFICANT CHANGE UP (ref 0–4)
REVIEW: SIGNIFICANT CHANGE UP
SODIUM SERPL-SCNC: 139 MMOL/L — SIGNIFICANT CHANGE UP (ref 135–145)
SP GR SPEC: 1.02 — SIGNIFICANT CHANGE UP (ref 1–1.03)
SQUAMOUS # UR AUTO: 36 /HPF — HIGH (ref 0–5)
UROBILINOGEN FLD QL: 1 MG/DL — SIGNIFICANT CHANGE UP (ref 0.2–1)
WBC # BLD: 5.45 K/UL — SIGNIFICANT CHANGE UP (ref 3.8–10.5)
WBC # FLD AUTO: 5.45 K/UL — SIGNIFICANT CHANGE UP (ref 3.8–10.5)
WBC UR QL: 4 /HPF — SIGNIFICANT CHANGE UP (ref 0–5)

## 2024-10-28 PROCEDURE — 99284 EMERGENCY DEPT VISIT MOD MDM: CPT

## 2024-10-28 RX ORDER — LIDOCAINE 50 MG/G
1 CREAM TOPICAL ONCE
Refills: 0 | Status: COMPLETED | OUTPATIENT
Start: 2024-10-28 | End: 2024-10-28

## 2024-10-28 RX ORDER — CYCLOBENZAPRINE HCL 10 MG
10 TABLET ORAL ONCE
Refills: 0 | Status: COMPLETED | OUTPATIENT
Start: 2024-10-28 | End: 2024-10-28

## 2024-10-28 RX ORDER — KETOROLAC TROMETHAMINE 10 MG/1
15 TABLET, FILM COATED ORAL ONCE
Refills: 0 | Status: DISCONTINUED | OUTPATIENT
Start: 2024-10-28 | End: 2024-10-28

## 2024-10-28 RX ORDER — SODIUM CHLORIDE 0.9 % (FLUSH) 0.9 %
1000 SYRINGE (ML) INJECTION ONCE
Refills: 0 | Status: COMPLETED | OUTPATIENT
Start: 2024-10-28 | End: 2024-10-28

## 2024-10-28 RX ADMIN — Medication 1000 MILLILITER(S): at 11:33

## 2024-10-28 RX ADMIN — KETOROLAC TROMETHAMINE 15 MILLIGRAM(S): 10 TABLET, FILM COATED ORAL at 11:30

## 2024-10-28 RX ADMIN — Medication 10 MILLIGRAM(S): at 11:30

## 2024-10-28 RX ADMIN — LIDOCAINE 1 PATCH: 50 CREAM TOPICAL at 11:29

## 2024-10-28 NOTE — ED ADULT TRIAGE NOTE - CHIEF COMPLAINT QUOTE
Pt presents to ED ambulatory from home with c/o lower back pain and swelling x 1 day. Pt reports pain started after waking up from nap, denies fall injury, trauma, heavy lifting. Pt endorses pain radiating up back. Pt denies urinary symptoms. Pt endorses hx of polycystic kidney disease.

## 2024-10-28 NOTE — ED PROVIDER NOTE - OBJECTIVE STATEMENT
24 F h/o PCKD PTED c/o atraumatic  lower back pain since yesteday that began after. after waking up from nap. No fever chlls dys/pyuria/hematuria, no  heavy lifting. Pt endorses pain radiates from lower to  back. No radiation elsewhere not numbness paresthesia perineal or other areas of numbness no continence issues

## 2024-10-28 NOTE — ED PROVIDER NOTE - CPE EDP PSYCH NORM
Advance Care Planning   Ambulatory ACP Specialist Patient Outreach    Date:  10/27/2022    ACP Specialist:  Dusty Quijano LPN    Outreach call to patient in follow-up to ACP Specialist referral from:    [x] PCP  [] Provider   [] Ambulatory Care Management [] Other     For:                  [x] Advance Directive Assistance              [] Complete Portable DNR order              [] Complete POST/MOST              [] Code Status Discussion             [] Discuss Goals of Care             [] Early ACP Decision-Making              [] Other (Specify)    Date Referral Received: 10/26/22    Today's Outreach:  [x] First   [] Second  [] Third       Third outreach made by: [] Phone  [] Email / mail    [] Body [employer]     Intervention:  [x] Spoke with Patient   [] Left VM requesting return call      Outcome: Thank you for this referral. ACP team was able to contact the pt who wishes to move forward in having an ACP conversation with an ACP specialist. Appointment scheduled for 11/2/22 at 9 am. ACP information has been e-mailed to the pt for review prior to the appointment. Next Step:   [x] ACP scheduled conversation  [] Outreach again in one week               [x] Email / Mail ACP Info Sheets  [] Email / Mail Advance Directive   [] Closing referral.  Routing closure to referring provider/staff and to ACP Specialist . [] Closure letter mailed to patient with invitation to contact ACP Specialist if / when ready.   Thank you for this referral.
normal...

## 2024-10-28 NOTE — ED ADULT NURSE NOTE - OBJECTIVE STATEMENT
pt received to intake 8, aox4.  pt c/o right flank pain since this am.  pt denies n/v/d, appears in no acute distress, SL placed, labs sent, NS infusing well.

## 2024-10-28 NOTE — ED PROVIDER NOTE - PATIENT PORTAL LINK FT
You can access the FollowMyHealth Patient Portal offered by [facility] by registering at the following website: [email or web address]. By joining [employer]’s FollowMyHealth portal, you will also be able to view your health information using other applications (apps) compatible with our system.

## 2024-10-28 NOTE — ED PROVIDER NOTE - CLINICAL SUMMARY MEDICAL DECISION MAKING FREE TEXT BOX
24 F h/o PCKD PTED c/o atraumatic  lower back pain since yesteday that began after. after waking up from nap. No fever chlls dys/pyuria/hematuria, no  heavy lifting. Pt endorses pain radiates from lower to  back. No radiation elsewhere not numbness paresthesia perineal or other areas of numbness no continence issues   VSS  PE as documented no CVAT  Data  See pullset   Plan   Will doubt appy stone will check for  worsening of PCKD w/   check labs UA creatinine  symptomatic tx  reassess  probable d/c unless result dictate further w/u

## 2024-10-28 NOTE — ED PROVIDER NOTE - CARE PROVIDER_API CALL
Brit Tinajero  Pediatrics  [address] [place] [address]  Phone: [phone]  Fax: [phone]  Established Patient  Follow Up Time: 1-3 Days

## 2024-10-28 NOTE — ED PROVIDER NOTE - NSFOLLOWUPINSTRUCTIONS_ED_ALL_ED_FT
We've got you covered from head to toe    Our specialty programs:    [facility]  [phone]  [email or web address]    Chip (Scheduling) team hours of operation:  Monday through Thursday, 7am to 8:30pm  Friday, 7am to 7pm  Saturday, 8am to 4pm    Back Pain  WHAT YOU NEED TO KNOW:  Back pain is common. It can be caused by many conditions, such as arthritis or the breakdown of spinal discs. Your risk for back pain is increased by injuries, lack of activity, or repeated bending and twisting. You may feel sore or stiff on one or both sides of your back. The pain may spread to your buttocks or thighs.  DISCHARGE INSTRUCTIONS:  Return to the emergency department if:   •You have pain, numbness, or weakness in one or both legs.  •Your pain becomes so severe that you cannot walk.  •You cannot control your urine or bowel movements.  •You have severe back pain with chest pain.  •You have severe back pain, nausea, and vomiting.  •You have severe back pain that spreads to your side or genital area.  Contact your healthcare provider if:   •You have back pain that does not get better with rest and pain medicine.  •You have a fever.  •You have pain that worsens when you are on your back or when you rest.  •You have pain that worsens when you cough or sneeze.  •You lose weight without trying.  •You have questions or concerns about your condition or care.  Medicines:   •NSAIDs help decrease swelling and pain. This medicine is available with or without a doctor's order. NSAIDs can cause stomach bleeding or kidney problems in certain people. If you take blood thinner medicine, always ask your healthcare provider if NSAIDs are safe for you. Always read the medicine label and follow directions.  •Acetaminophen decreases pain and fever. It is available without a doctor's order. Ask how much to take and how often to take it. Follow directions. Read the labels of all other medicines you are using to see if they also contain acetaminophen, or ask your doctor or pharmacist. Acetaminophen can cause liver damage if not taken correctly. Do not use more than 4 grams (4,000 milligrams) total of acetaminophen in one day.   •Muscle relaxers help decrease muscle spasms and back pain.  •Prescription pain medicine may be given. Ask your healthcare provider how to take this medicine safely. Some prescription pain medicines contain acetaminophen. Do not take other medicines that contain acetaminophen without talking to your healthcare provider. Too much acetaminophen may cause liver damage. Prescription pain medicine may cause constipation. Ask your healthcare provider how to prevent or treat constipation.   •Take your medicine as directed. Contact your healthcare provider if you think your medicine is not helping or if you have side effects. Tell him or her if you are allergic to any medicine. Keep a list of the medicines, vitamins, and herbs you take. Include the amounts, and when and why you take them. Bring the list or the pill bottles to follow-up visits. Carry your medicine list with you in case of an emergency.  How to manage your back pain:   •Apply ice on your back for 15 to 20 minutes every hour or as directed. Use an ice pack, or put crushed ice in a plastic bag. Cover it with a towel before you apply it to your skin. Ice helps prevent tissue damage and decreases pain.  •Apply heat on your back for 20 to 30 minutes every 2 hours for as many days as directed. Heat helps decrease pain and muscle spasms.  •Stay active as much as you can without causing more pain. Bed rest could make your back pain worse. Avoid heavy lifting until your pain is gone.  •Go to physical therapy as directed. A physical therapist can teach you exercises to help improve movement and strength, and to decrease pain.  Follow up with your healthcare provider in 2 weeks, or as directed: Write down your questions so you remember to ask them during your visits.

## 2024-10-28 NOTE — ED PROVIDER NOTE - NSICDXPASTMEDICALHX_GEN_ALL_CORE_FT
PAST MEDICAL HISTORY:  No pertinent past medical history      PAST MEDICAL HISTORY:  Polycystic kidney

## 2024-10-29 LAB
CULTURE RESULTS: SIGNIFICANT CHANGE UP
SPECIMEN SOURCE: SIGNIFICANT CHANGE UP

## 2025-05-08 NOTE — ED PROVIDER NOTE - MDM PATIENT STATEMENT FOR ADDL TREATMENT
[UNKNOWN],[[id number]] Patient with one or more new problems requiring additional work-up/treatment.

## 2025-06-12 NOTE — ED ADULT TRIAGE NOTE - TEMPERATURE IN CELSIUS (DEGREES C)
Vaginitis  Antimicrobial as prescribed until complete.  Labs as ordered.  Abstinence as instructed.  Monitor for fevers, abdominal/back pain, rash/lesions or other concerns.    36.7